# Patient Record
Sex: FEMALE | Race: WHITE | NOT HISPANIC OR LATINO
[De-identification: names, ages, dates, MRNs, and addresses within clinical notes are randomized per-mention and may not be internally consistent; named-entity substitution may affect disease eponyms.]

---

## 2020-10-08 ENCOUNTER — RESULT REVIEW (OUTPATIENT)
Age: 25
End: 2020-10-08

## 2020-12-10 ENCOUNTER — EMERGENCY (EMERGENCY)
Facility: HOSPITAL | Age: 25
LOS: 1 days | Discharge: HOME | End: 2020-12-10
Attending: EMERGENCY MEDICINE | Admitting: EMERGENCY MEDICINE
Payer: COMMERCIAL

## 2020-12-10 VITALS
DIASTOLIC BLOOD PRESSURE: 80 MMHG | HEART RATE: 105 BPM | HEIGHT: 61 IN | SYSTOLIC BLOOD PRESSURE: 137 MMHG | OXYGEN SATURATION: 100 % | WEIGHT: 104.06 LBS | TEMPERATURE: 99 F | RESPIRATION RATE: 18 BRPM

## 2020-12-10 VITALS
SYSTOLIC BLOOD PRESSURE: 96 MMHG | RESPIRATION RATE: 16 BRPM | HEART RATE: 84 BPM | DIASTOLIC BLOOD PRESSURE: 55 MMHG | OXYGEN SATURATION: 99 %

## 2020-12-10 DIAGNOSIS — R19.7 DIARRHEA, UNSPECIFIED: ICD-10-CM

## 2020-12-10 DIAGNOSIS — R11.2 NAUSEA WITH VOMITING, UNSPECIFIED: ICD-10-CM

## 2020-12-10 DIAGNOSIS — Z88.0 ALLERGY STATUS TO PENICILLIN: ICD-10-CM

## 2020-12-10 DIAGNOSIS — R10.84 GENERALIZED ABDOMINAL PAIN: ICD-10-CM

## 2020-12-10 DIAGNOSIS — Z20.828 CONTACT WITH AND (SUSPECTED) EXPOSURE TO OTHER VIRAL COMMUNICABLE DISEASES: ICD-10-CM

## 2020-12-10 LAB
ALBUMIN SERPL ELPH-MCNC: 4.7 G/DL — SIGNIFICANT CHANGE UP (ref 3.3–5)
ALP SERPL-CCNC: 48 U/L — SIGNIFICANT CHANGE UP (ref 40–120)
ALT FLD-CCNC: 19 U/L — SIGNIFICANT CHANGE UP (ref 10–45)
ANION GAP SERPL CALC-SCNC: 11 MMOL/L — SIGNIFICANT CHANGE UP (ref 5–17)
AST SERPL-CCNC: 20 U/L — SIGNIFICANT CHANGE UP (ref 10–40)
BASOPHILS # BLD AUTO: 0.03 K/UL — SIGNIFICANT CHANGE UP (ref 0–0.2)
BASOPHILS NFR BLD AUTO: 0.7 % — SIGNIFICANT CHANGE UP (ref 0–2)
BILIRUB SERPL-MCNC: 0.4 MG/DL — SIGNIFICANT CHANGE UP (ref 0.2–1.2)
BUN SERPL-MCNC: 8 MG/DL — SIGNIFICANT CHANGE UP (ref 7–23)
CALCIUM SERPL-MCNC: 9.7 MG/DL — SIGNIFICANT CHANGE UP (ref 8.4–10.5)
CHLORIDE SERPL-SCNC: 100 MMOL/L — SIGNIFICANT CHANGE UP (ref 96–108)
CO2 SERPL-SCNC: 26 MMOL/L — SIGNIFICANT CHANGE UP (ref 22–31)
CREAT SERPL-MCNC: 0.66 MG/DL — SIGNIFICANT CHANGE UP (ref 0.5–1.3)
EOSINOPHIL # BLD AUTO: 0.04 K/UL — SIGNIFICANT CHANGE UP (ref 0–0.5)
EOSINOPHIL NFR BLD AUTO: 0.9 % — SIGNIFICANT CHANGE UP (ref 0–6)
GLUCOSE SERPL-MCNC: 89 MG/DL — SIGNIFICANT CHANGE UP (ref 70–99)
HCT VFR BLD CALC: 41.3 % — SIGNIFICANT CHANGE UP (ref 34.5–45)
HGB BLD-MCNC: 13.5 G/DL — SIGNIFICANT CHANGE UP (ref 11.5–15.5)
IMM GRANULOCYTES NFR BLD AUTO: 0.2 % — SIGNIFICANT CHANGE UP (ref 0–1.5)
LYMPHOCYTES # BLD AUTO: 1.27 K/UL — SIGNIFICANT CHANGE UP (ref 1–3.3)
LYMPHOCYTES # BLD AUTO: 27.6 % — SIGNIFICANT CHANGE UP (ref 13–44)
MCHC RBC-ENTMCNC: 29.2 PG — SIGNIFICANT CHANGE UP (ref 27–34)
MCHC RBC-ENTMCNC: 32.7 GM/DL — SIGNIFICANT CHANGE UP (ref 32–36)
MCV RBC AUTO: 89.2 FL — SIGNIFICANT CHANGE UP (ref 80–100)
MONOCYTES # BLD AUTO: 0.3 K/UL — SIGNIFICANT CHANGE UP (ref 0–0.9)
MONOCYTES NFR BLD AUTO: 6.5 % — SIGNIFICANT CHANGE UP (ref 2–14)
NEUTROPHILS # BLD AUTO: 2.95 K/UL — SIGNIFICANT CHANGE UP (ref 1.8–7.4)
NEUTROPHILS NFR BLD AUTO: 64.1 % — SIGNIFICANT CHANGE UP (ref 43–77)
NRBC # BLD: 0 /100 WBCS — SIGNIFICANT CHANGE UP (ref 0–0)
PLATELET # BLD AUTO: 198 K/UL — SIGNIFICANT CHANGE UP (ref 150–400)
POTASSIUM SERPL-MCNC: 4 MMOL/L — SIGNIFICANT CHANGE UP (ref 3.5–5.3)
POTASSIUM SERPL-SCNC: 4 MMOL/L — SIGNIFICANT CHANGE UP (ref 3.5–5.3)
PROT SERPL-MCNC: 7.6 G/DL — SIGNIFICANT CHANGE UP (ref 6–8.3)
RBC # BLD: 4.63 M/UL — SIGNIFICANT CHANGE UP (ref 3.8–5.2)
RBC # FLD: 12.3 % — SIGNIFICANT CHANGE UP (ref 10.3–14.5)
SARS-COV-2 RNA SPEC QL NAA+PROBE: SIGNIFICANT CHANGE UP
SODIUM SERPL-SCNC: 137 MMOL/L — SIGNIFICANT CHANGE UP (ref 135–145)
WBC # BLD: 4.6 K/UL — SIGNIFICANT CHANGE UP (ref 3.8–10.5)
WBC # FLD AUTO: 4.6 K/UL — SIGNIFICANT CHANGE UP (ref 3.8–10.5)

## 2020-12-10 PROCEDURE — 36415 COLL VENOUS BLD VENIPUNCTURE: CPT

## 2020-12-10 PROCEDURE — 96374 THER/PROPH/DIAG INJ IV PUSH: CPT

## 2020-12-10 PROCEDURE — 80053 COMPREHEN METABOLIC PANEL: CPT

## 2020-12-10 PROCEDURE — U0003: CPT

## 2020-12-10 PROCEDURE — 99284 EMERGENCY DEPT VISIT MOD MDM: CPT

## 2020-12-10 PROCEDURE — 85025 COMPLETE CBC W/AUTO DIFF WBC: CPT

## 2020-12-10 PROCEDURE — 99284 EMERGENCY DEPT VISIT MOD MDM: CPT | Mod: 25

## 2020-12-10 RX ORDER — SODIUM CHLORIDE 9 MG/ML
1000 INJECTION INTRAMUSCULAR; INTRAVENOUS; SUBCUTANEOUS ONCE
Refills: 0 | Status: COMPLETED | OUTPATIENT
Start: 2020-12-10 | End: 2020-12-10

## 2020-12-10 RX ORDER — ONDANSETRON 8 MG/1
4 TABLET, FILM COATED ORAL ONCE
Refills: 0 | Status: COMPLETED | OUTPATIENT
Start: 2020-12-10 | End: 2020-12-10

## 2020-12-10 RX ADMIN — SODIUM CHLORIDE 1000 MILLILITER(S): 9 INJECTION INTRAMUSCULAR; INTRAVENOUS; SUBCUTANEOUS at 11:12

## 2020-12-10 RX ADMIN — ONDANSETRON 4 MILLIGRAM(S): 8 TABLET, FILM COATED ORAL at 11:12

## 2020-12-10 NOTE — ED PROVIDER NOTE - NSFOLLOWUPINSTRUCTIONS_ED_ALL_ED_FT
ACUTE DIARRHEA - General Information           Acute Diarrhea    WHAT YOU NEED TO KNOW:    What do I need to know about acute diarrhea?Acute diarrhea starts quickly and lasts a short time, usually 1 to 3 days. It can last up to 2 weeks.     What causes acute diarrhea?   •Bacteria, such as E coli or salmonella      •Viruses, such as rotavirus and norovirus      •A parasite, such as giardia      •Medicines, such as laxatives, antacids, or antibiotics      •An allergy to lactose, soy, or gluten      •Eating food or drinking water that contains germs      •Medical treatments, such as chemotherapy or radiation      What other signs and symptoms might I have with acute diarrhea? You may have 3 or more episodes of diarrhea. It may be hard to control your diarrhea. You may also have any of the following:   •Fever and chills      •Headache or abdominal pain      •Nausea and vomiting      •Symptoms of dehydration such as thirst, decreased urination, dry skin, sunken eyes, or fast, pounding heartbeat      What does my healthcare provider need to know about my acute diarrhea? Your healthcare provider will ask about your symptoms. He or she will ask what you have recently eaten and if you have traveled to other countries. Tell the provider what medicines you use or if you have been around anyone who is sick. Your healthcare provider may check you for signs of dehydration. He or she may also test your blood and bowel movement for infection.     How is acute diarrhea treated? Acute diarrhea usually gets better without treatment. You may need any of the following if your diarrhea is severe or lasts longer than a few days:   •Diarrhea medicine is an over-the-counter medicine that helps slow or stop your diarrhea. Do not take this medicine unless your healthcare provider says it is okay.       •Antibiotics may be given to help treat an infection caused by bacteria.       •Antiparasitics may be given to treat an infection caused by parasites.       How can acute diarrhea be managed?   •Drink liquids as directed. Liquids will help prevent dehydration caused by diarrhea. Ask your healthcare provider how much liquid to drink each day and which liquids are best for you. You may need to drink an oral rehydration solution (ORS). An ORS has the right amounts of water, salts, and sugar you need to replace body fluids. You can buy an ORS at most grocery stores and pharmacies.       •Eat foods that are easy to digest. Examples include rice, lentils, cereal, bananas, potatoes, and bread. It also includes some fruits (bananas, melon), well-cooked vegetables, and lean meats. Do not eat foods high in fiber, fat, or sugar. Also, do not drink alcohol until your diarrhea is gone.       How can acute diarrhea be prevented?   •Wash your hands often. Use soap and water. Wash your hands before you eat or prepare food. Also wash your hands after you use the bathroom. Use an alcohol-based hand gel when soap and water are not available.  Handwashing           •Keep bathroom surfaces clean. This helps prevent the spread of germs that cause acute diarrhea.       •Wash fruits and vegetables well before you eat them. This can help remove germs that cause diarrhea. If possible, remove the skin from fruits and vegetables, or cook them well before you eat them.       •Cook meat and poultry as directed. Meat includes beef and pork. Poultry includes chicken, turkey, and duck.?Cook ground meat to 160°F.       ?Cook ground poultry, whole poultry, or cuts of poultry to at least 165°F. Remove the poultry from heat. Let it stand for 3 minutes before you eat it.       ?Cook whole cuts of meat other than poultry to at least 145°F. Remove the meat from heat. Let it stand for 3 minutes before you eat it.       •Wash dishes that have touched raw meat or poultry with hot water and soap. This includes cutting boards, utensils, dishes, and serving containers.       •Place raw or cooked meat in the refrigerator as soon as possible. Bacteria can grow in meat that is left at room temperature too long.       •Do not eat raw or undercooked oysters, clams, or mussels. These foods may be contaminated and cause infection.       •Drink only filtered or treated water when you travel. Do not put ice in your drinks. Drink bottled water whenever possible.       When should I seek immediate care?   •You feel confused.       •Your heartbeat is faster than usual.       •Your eyes look deeply sunken, or you have no tears when you cry.       •You urinate less than usual, or your urine is dark yellow.       •You have blood or mucus in your bowel movements.      •You have severe abdominal pain.       •You are unable to drink any liquids.       When should I contact my healthcare provider?   •Your symptoms do not get better with treatment.       •You have a fever higher than 101.3°F (38.5°C).       •You have trouble eating and drinking because you are vomiting.       •Your diarrhea does not get better in 7 days.       •You have questions or concerns about your condition or care.       CARE AGREEMENT:    You have the right to help plan your care. Learn about your health condition and how it may be treated. Discuss treatment options with your healthcare providers to decide what care you want to receive. You always have the right to refuse treatment.

## 2020-12-10 NOTE — ED ADULT NURSE NOTE - OBJECTIVE STATEMENT
Presents to Ed c/o diarrhea, +N/V, slight SOB, and fatigue. Started this AM, denies CP. AAOx3, MAEx4. Pt works in OR at [employer]. Denies fevers.

## 2020-12-10 NOTE — ED PROVIDER NOTE - OBJECTIVE STATEMENT
26 y/o F [employer] employee w/ no significant PMHx presents to the ED c/o diffuse abd cramping, multiple episodes of V/D since this morning. Denies fever, chills, blood in stool, CP, SOB or any other acute sx. No meds pta. No new foods yesterday. Before work did questionnaire and was recommended to come to the ED for evaluation.

## 2020-12-10 NOTE — ED PROVIDER NOTE - CLINICAL SUMMARY MEDICAL DECISION MAKING FREE TEXT BOX
24 y/o F [employer] employee w/ no significant PMHx presents c/o diffuse abd cramping, multiple episodes of V/D since this morning. Plan for labs, will hydrate and give anti nausea meds. Anticipate d/c home.

## 2020-12-10 NOTE — ED PROVIDER NOTE - PATIENT PORTAL LINK FT
You can access the FollowMyHealth Patient Portal offered by [facility] by registering at the following website: [email or web address]. By joining [employer]’s FollowMyHealth portal, you will also be able to view your health information using other applications (apps) compatible with our system.

## 2020-12-15 ENCOUNTER — RESULT REVIEW (OUTPATIENT)
Age: 25
End: 2020-12-15

## 2020-12-15 ENCOUNTER — APPOINTMENT (OUTPATIENT)
Dept: OBGYN | Facility: CLINIC | Age: 25
End: 2020-12-15
Payer: COMMERCIAL

## 2020-12-15 PROBLEM — Z78.9 OTHER SPECIFIED HEALTH STATUS: Chronic | Status: ACTIVE | Noted: 2020-12-10

## 2020-12-15 PROCEDURE — 99395 PREV VISIT EST AGE 18-39: CPT

## 2020-12-15 PROCEDURE — 99072 ADDL SUPL MATRL&STAF TM PHE: CPT

## 2021-04-06 ENCOUNTER — APPOINTMENT (OUTPATIENT)
Dept: OBGYN | Facility: CLINIC | Age: 26
End: 2021-04-06
Payer: COMMERCIAL

## 2021-04-06 PROCEDURE — 99072 ADDL SUPL MATRL&STAF TM PHE: CPT

## 2021-04-06 PROCEDURE — 99213 OFFICE O/P EST LOW 20 MIN: CPT

## 2021-04-16 ENCOUNTER — APPOINTMENT (OUTPATIENT)
Dept: OBGYN | Facility: CLINIC | Age: 26
End: 2021-04-16

## 2021-08-23 PROBLEM — Z00.00 ENCOUNTER FOR PREVENTIVE HEALTH EXAMINATION: Status: ACTIVE | Noted: 2021-08-23

## 2023-04-04 ENCOUNTER — ASOB RESULT (OUTPATIENT)
Age: 28
End: 2023-04-04

## 2023-04-04 ENCOUNTER — APPOINTMENT (OUTPATIENT)
Dept: ANTEPARTUM | Facility: CLINIC | Age: 28
End: 2023-04-04
Payer: COMMERCIAL

## 2023-04-04 ENCOUNTER — NON-APPOINTMENT (OUTPATIENT)
Age: 28
End: 2023-04-04

## 2023-04-04 PROCEDURE — 76811 OB US DETAILED SNGL FETUS: CPT

## 2023-04-04 PROCEDURE — 99202 OFFICE O/P NEW SF 15 MIN: CPT | Mod: 25

## 2023-07-17 ENCOUNTER — APPOINTMENT (OUTPATIENT)
Dept: OBGYN | Facility: CLINIC | Age: 28
End: 2023-07-17
Payer: COMMERCIAL

## 2023-07-17 PROCEDURE — 0502F SUBSEQUENT PRENATAL CARE: CPT

## 2023-07-19 ENCOUNTER — APPOINTMENT (OUTPATIENT)
Dept: OBGYN | Facility: CLINIC | Age: 28
End: 2023-07-19

## 2023-07-24 ENCOUNTER — APPOINTMENT (OUTPATIENT)
Dept: OBGYN | Facility: CLINIC | Age: 28
End: 2023-07-24
Payer: COMMERCIAL

## 2023-07-24 PROCEDURE — 0502F SUBSEQUENT PRENATAL CARE: CPT

## 2023-07-31 ENCOUNTER — APPOINTMENT (OUTPATIENT)
Dept: OBGYN | Facility: CLINIC | Age: 28
End: 2023-07-31
Payer: COMMERCIAL

## 2023-07-31 PROCEDURE — 0502F SUBSEQUENT PRENATAL CARE: CPT

## 2023-08-04 ENCOUNTER — APPOINTMENT (OUTPATIENT)
Dept: OBGYN | Facility: CLINIC | Age: 28
End: 2023-08-04
Payer: COMMERCIAL

## 2023-08-04 PROCEDURE — 0502F SUBSEQUENT PRENATAL CARE: CPT

## 2023-08-04 PROCEDURE — 59425 ANTEPARTUM CARE ONLY: CPT

## 2023-08-09 ENCOUNTER — INPATIENT (INPATIENT)
Facility: HOSPITAL | Age: 28
LOS: 1 days | Discharge: HOME | End: 2023-08-11
Attending: OBSTETRICS & GYNECOLOGY | Admitting: OBSTETRICS & GYNECOLOGY
Payer: COMMERCIAL

## 2023-08-09 VITALS
TEMPERATURE: 98 F | OXYGEN SATURATION: 100 % | SYSTOLIC BLOOD PRESSURE: 115 MMHG | DIASTOLIC BLOOD PRESSURE: 76 MMHG | RESPIRATION RATE: 18 BRPM | HEART RATE: 85 BPM

## 2023-08-09 DIAGNOSIS — Z34.80 ENCOUNTER FOR SUPERVISION OF OTHER NORMAL PREGNANCY, UNSPECIFIED TRIMESTER: ICD-10-CM

## 2023-08-09 DIAGNOSIS — O26.899 OTHER SPECIFIED PREGNANCY RELATED CONDITIONS, UNSPECIFIED TRIMESTER: ICD-10-CM

## 2023-08-09 DIAGNOSIS — Z34.90 ENCOUNTER FOR SUPERVISION OF NORMAL PREGNANCY, UNSPECIFIED, UNSPECIFIED TRIMESTER: ICD-10-CM

## 2023-08-09 LAB
BASOPHILS # BLD AUTO: 0.02 K/UL — SIGNIFICANT CHANGE UP (ref 0–0.2)
BASOPHILS NFR BLD AUTO: 0.1 % — SIGNIFICANT CHANGE UP (ref 0–2)
COVID-19 SPIKE DOMAIN AB INTERP: POSITIVE
COVID-19 SPIKE DOMAIN ANTIBODY RESULT: >250 U/ML — HIGH
EOSINOPHIL # BLD AUTO: 0.04 K/UL — SIGNIFICANT CHANGE UP (ref 0–0.5)
EOSINOPHIL NFR BLD AUTO: 0.3 % — SIGNIFICANT CHANGE UP (ref 0–6)
HCT VFR BLD CALC: 35.9 % — SIGNIFICANT CHANGE UP (ref 34.5–45)
HGB BLD-MCNC: 11 G/DL — LOW (ref 11.5–15.5)
IMM GRANULOCYTES NFR BLD AUTO: 0.9 % — SIGNIFICANT CHANGE UP (ref 0–0.9)
LYMPHOCYTES # BLD AUTO: 1.17 K/UL — SIGNIFICANT CHANGE UP (ref 1–3.3)
LYMPHOCYTES # BLD AUTO: 8.5 % — LOW (ref 13–44)
MCHC RBC-ENTMCNC: 23.6 PG — LOW (ref 27–34)
MCHC RBC-ENTMCNC: 30.6 GM/DL — LOW (ref 32–36)
MCV RBC AUTO: 77 FL — LOW (ref 80–100)
MONOCYTES # BLD AUTO: 0.5 K/UL — SIGNIFICANT CHANGE UP (ref 0–0.9)
MONOCYTES NFR BLD AUTO: 3.7 % — SIGNIFICANT CHANGE UP (ref 2–14)
NEUTROPHILS # BLD AUTO: 11.83 K/UL — HIGH (ref 1.8–7.4)
NEUTROPHILS NFR BLD AUTO: 86.5 % — HIGH (ref 43–77)
NRBC # BLD: 0 /100 WBCS — SIGNIFICANT CHANGE UP (ref 0–0)
PLATELET # BLD AUTO: 233 K/UL — SIGNIFICANT CHANGE UP (ref 150–400)
RBC # BLD: 4.66 M/UL — SIGNIFICANT CHANGE UP (ref 3.8–5.2)
RBC # FLD: 14.8 % — HIGH (ref 10.3–14.5)
SARS-COV-2 IGG+IGM SERPL QL IA: >250 U/ML — HIGH
SARS-COV-2 IGG+IGM SERPL QL IA: POSITIVE
T PALLIDUM AB TITR SER: NEGATIVE — SIGNIFICANT CHANGE UP
WBC # BLD: 13.69 K/UL — HIGH (ref 3.8–10.5)
WBC # FLD AUTO: 13.69 K/UL — HIGH (ref 3.8–10.5)

## 2023-08-09 RX ORDER — DIPHENHYDRAMINE HCL 50 MG
25 CAPSULE ORAL EVERY 6 HOURS
Refills: 0 | Status: DISCONTINUED | OUTPATIENT
Start: 2023-08-09 | End: 2023-08-11

## 2023-08-09 RX ORDER — OXYTOCIN 10 UNIT/ML
333.33 VIAL (ML) INJECTION
Qty: 20 | Refills: 0 | Status: DISCONTINUED | OUTPATIENT
Start: 2023-08-09 | End: 2023-08-09

## 2023-08-09 RX ORDER — KETOROLAC TROMETHAMINE 30 MG/ML
30 SYRINGE (ML) INJECTION ONCE
Refills: 0 | Status: DISCONTINUED | OUTPATIENT
Start: 2023-08-09 | End: 2023-08-09

## 2023-08-09 RX ORDER — SIMETHICONE 80 MG/1
80 TABLET, CHEWABLE ORAL EVERY 4 HOURS
Refills: 0 | Status: DISCONTINUED | OUTPATIENT
Start: 2023-08-09 | End: 2023-08-11

## 2023-08-09 RX ORDER — OXYCODONE HYDROCHLORIDE 5 MG/1
5 TABLET ORAL
Refills: 0 | Status: DISCONTINUED | OUTPATIENT
Start: 2023-08-09 | End: 2023-08-11

## 2023-08-09 RX ORDER — DIBUCAINE 1 %
1 OINTMENT (GRAM) RECTAL EVERY 6 HOURS
Refills: 0 | Status: DISCONTINUED | OUTPATIENT
Start: 2023-08-09 | End: 2023-08-11

## 2023-08-09 RX ORDER — SODIUM CHLORIDE 9 MG/ML
1000 INJECTION, SOLUTION INTRAVENOUS
Refills: 0 | Status: DISCONTINUED | OUTPATIENT
Start: 2023-08-09 | End: 2023-08-09

## 2023-08-09 RX ORDER — IBUPROFEN 200 MG
600 TABLET ORAL EVERY 6 HOURS
Refills: 0 | Status: COMPLETED | OUTPATIENT
Start: 2023-08-09 | End: 2024-07-07

## 2023-08-09 RX ORDER — HYDROCORTISONE 1 %
1 OINTMENT (GRAM) TOPICAL EVERY 6 HOURS
Refills: 0 | Status: DISCONTINUED | OUTPATIENT
Start: 2023-08-09 | End: 2023-08-11

## 2023-08-09 RX ORDER — MAGNESIUM HYDROXIDE 400 MG/1
30 TABLET, CHEWABLE ORAL
Refills: 0 | Status: DISCONTINUED | OUTPATIENT
Start: 2023-08-09 | End: 2023-08-11

## 2023-08-09 RX ORDER — SODIUM CHLORIDE 9 MG/ML
3 INJECTION INTRAMUSCULAR; INTRAVENOUS; SUBCUTANEOUS EVERY 8 HOURS
Refills: 0 | Status: DISCONTINUED | OUTPATIENT
Start: 2023-08-09 | End: 2023-08-11

## 2023-08-09 RX ORDER — OXYTOCIN 10 UNIT/ML
41.67 VIAL (ML) INJECTION
Qty: 20 | Refills: 0 | Status: DISCONTINUED | OUTPATIENT
Start: 2023-08-09 | End: 2023-08-11

## 2023-08-09 RX ORDER — CITRIC ACID/SODIUM CITRATE 300-500 MG
15 SOLUTION, ORAL ORAL EVERY 6 HOURS
Refills: 0 | Status: DISCONTINUED | OUTPATIENT
Start: 2023-08-09 | End: 2023-08-09

## 2023-08-09 RX ORDER — ACETAMINOPHEN 500 MG
975 TABLET ORAL
Refills: 0 | Status: DISCONTINUED | OUTPATIENT
Start: 2023-08-09 | End: 2023-08-11

## 2023-08-09 RX ORDER — LANOLIN
1 OINTMENT (GRAM) TOPICAL EVERY 6 HOURS
Refills: 0 | Status: DISCONTINUED | OUTPATIENT
Start: 2023-08-09 | End: 2023-08-11

## 2023-08-09 RX ORDER — CHLORHEXIDINE GLUCONATE 213 G/1000ML
1 SOLUTION TOPICAL DAILY
Refills: 0 | Status: DISCONTINUED | OUTPATIENT
Start: 2023-08-09 | End: 2023-08-09

## 2023-08-09 RX ORDER — BENZOCAINE 10 %
1 GEL (GRAM) MUCOUS MEMBRANE EVERY 6 HOURS
Refills: 0 | Status: DISCONTINUED | OUTPATIENT
Start: 2023-08-09 | End: 2023-08-11

## 2023-08-09 RX ORDER — PRAMOXINE HYDROCHLORIDE 150 MG/15G
1 AEROSOL, FOAM RECTAL EVERY 4 HOURS
Refills: 0 | Status: DISCONTINUED | OUTPATIENT
Start: 2023-08-09 | End: 2023-08-11

## 2023-08-09 RX ORDER — TETANUS TOXOID, REDUCED DIPHTHERIA TOXOID AND ACELLULAR PERTUSSIS VACCINE, ADSORBED 5; 2.5; 8; 8; 2.5 [IU]/.5ML; [IU]/.5ML; UG/.5ML; UG/.5ML; UG/.5ML
0.5 SUSPENSION INTRAMUSCULAR ONCE
Refills: 0 | Status: DISCONTINUED | OUTPATIENT
Start: 2023-08-09 | End: 2023-08-11

## 2023-08-09 RX ORDER — OXYCODONE HYDROCHLORIDE 5 MG/1
5 TABLET ORAL ONCE
Refills: 0 | Status: DISCONTINUED | OUTPATIENT
Start: 2023-08-09 | End: 2023-08-11

## 2023-08-09 RX ORDER — AER TRAVELER 0.5 G/1
1 SOLUTION RECTAL; TOPICAL EVERY 4 HOURS
Refills: 0 | Status: DISCONTINUED | OUTPATIENT
Start: 2023-08-09 | End: 2023-08-11

## 2023-08-09 RX ADMIN — Medication 30 MILLIGRAM(S): at 17:50

## 2023-08-09 RX ADMIN — Medication 30 MILLIGRAM(S): at 19:11

## 2023-08-09 RX ADMIN — Medication 975 MILLIGRAM(S): at 22:00

## 2023-08-09 RX ADMIN — Medication 975 MILLIGRAM(S): at 21:15

## 2023-08-09 RX ADMIN — SODIUM CHLORIDE 3 MILLILITER(S): 9 INJECTION INTRAMUSCULAR; INTRAVENOUS; SUBCUTANEOUS at 21:30

## 2023-08-09 NOTE — OB PROVIDER H&P - NS PANP COMMENT GEN_ALL_CORE FT
pt assessed at bedside  P0 in labor  requesting epidural  admit to L&D  will examine pt when more comfortable

## 2023-08-09 NOTE — OB RN DELIVERY SUMMARY - NS_SEPSISRSKCALC_OBGYN_ALL_OB_FT
EOS calculated successfully. EOS Risk Factor: 0.5/1000 live births ([facility] national incidence); GA=39w6d; Temp=98.6; ROM=3.183; GBS='Negative'; Antibiotics='No antibiotics or any antibiotics < 2 hrs prior to birth'

## 2023-08-09 NOTE — OB PROVIDER H&P - NS_FHRACCEL_OBGYN_ALL_OB
Message reviewed, no further action needed.  
Patient is returning call, please see message below.     Callback Number: [phone] (home)   Best Availability: anytime  Can A Detailed Message Be Left? yes  Did you confirm the message with the caller?: yes  Patient states she received a call 11/26/19 regarding an order and would like to speak to RN, writer unable to locate and note.  Thank you,  Lily Greenberg    
Pt called stating she got a message from our office, it was an automated recording asking about orders. Writer notified pt there are no messages regarding anyone calling pt.    Will forward to Dr. Hopper to see if she called pt.  Pt stated no need to call back if no one called her from this office.  
Present (15 x15 bpm)

## 2023-08-09 NOTE — OB PROVIDER H&P - PROBLEM SELECTOR PLAN 1
27 year old  at 39.6 weeks in labor, -GBS    -Admit to L and D  -Routine labs  -NPO/IVF  -Bictra  -EFM/toco  -Anesthesia consult  -Expectant management  -Anticipate

## 2023-08-09 NOTE — OB PROVIDER H&P - HISTORY OF PRESENT ILLNESS
27 year old  at 39.6 weeks presents with contractions q5min since 4am. -LOF, -VB, +FM. Uncomplicated pregnancy. -GBS.    ObHx: primigravida  MedHx: denies  SurgHx: rhinoplasty  GynHx: denies fibroids, cysts, abnormal paps, STIs  SocialHx: denies ETOH, tobacco, drug use  PsychHx: denies anxiety, depression  All: PCN-rash; NKFA, NKEA  Meds: PNV    Vital Signs Last 24 Hrs  T(C): 36.7 (09 Aug 2023 09:41), Max: 36.7 (09 Aug 2023 09:22)  T(F): 98.1 (09 Aug 2023 09:41), Max: 98.1 (09 Aug 2023 09:24)  HR: 81 (09 Aug 2023 09:47) (81 - 90)  BP: 115/76 (09 Aug 2023 09:41) (115/76 - 115/76)  BP(mean): --  RR: 16 (09 Aug 2023 09:41) (16 - 18)  SpO2: 100% (09 Aug 2023 09:47) (100% - 100%)    PE: NAD, AOx3, abdomen soft gravid  VE: 4.0/90/-2  EFM: 125, moderate variability, -accels, -decels  [place]: 2-4min   EFW:   
Clear

## 2023-08-09 NOTE — PRE-ANESTHESIA EVALUATION ADULT - NSANTHSNORERD_ENT_A_CORE
You have worsening swelling associated with difficulty opening her mouth, fever chills, please return to the ER.  Is more to follow-up with your regular doctor moving forward.  
No

## 2023-08-09 NOTE — OB PROVIDER DELIVERY SUMMARY - NSPROVIDERDELIVERYNOTE_OBGYN_ALL_OB_FT
Spontaneous vaginal delivery of liveborn infant girl from OA position  Left compound hand and short cord noted   APGARS 9/9  Delayed cord clamping performed  Cord gases collected  Placenta delivered intact  Fundus firm  Intact cervix, vaginal walls and sulci  2nd deg lac repaired with 2.0 vicryl rapide   Excellent hemostasis  Count correct x 2    lidia morelandm

## 2023-08-09 NOTE — OB PROVIDER LABOR PROGRESS NOTE - NS_OBIHIFHRDETAILS_OBGYN_ALL_OB_FT
135bpm, mod variability, +accels, no decels
135bpm, mod variability, +accels, no decels
145bpm, mod variability, +accels, no decels

## 2023-08-09 NOTE — OB RN DELIVERY SUMMARY - NSSELHIDDEN_OBGYN_ALL_OB_FT
[NS_DeliveryAttending1_OBGYN_ALL_OB_FT:[id number]==],[NS_DeliveryRN_OBGYN_ALL_OB_FT:[id number]]

## 2023-08-09 NOTE — OB RN PATIENT PROFILE - FALL HARM RISK - UNIVERSAL INTERVENTIONS
[de-identified] : NSR RSR' pattern V1, no change from prior Bed in lowest position, wheels locked, appropriate side rails in place/Call bell, personal items and telephone in reach/Instruct patient to call for assistance before getting out of bed or chair/Non-slip footwear when patient is out of bed/[place] to call system/Physically safe environment - no spills, clutter or unnecessary equipment/Purposeful Proactive Rounding/Room/bathroom lighting operational, light cord in reach

## 2023-08-09 NOTE — OB PROVIDER LABOR PROGRESS NOTE - ASSESSMENT
AROM, clear fluid    - pt awaiting  to return from home  - will start pushing when he arrives  - anticipate  
- pt to start pushing  - anticipate 
28 yo P0 at 39.6 wks  active labor  GBS neg  Cat I Tracing    Plan  - expectant management  - pt to advise with srom or urge to push

## 2023-08-09 NOTE — OB PROVIDER H&P - NSLOWPPHRISK_OBGYN_A_OB
No previous uterine incision/[place] Pregnancy/Less than or equal to 4 previous vaginal births/No known bleeding disorder/No history of postpartum hemorrhage/No other PPH risks indicated

## 2023-08-09 NOTE — OB PROVIDER H&P - ASSESSMENT
27 year old  at 39.6 weeks in labor, -GBS    -Admit to L and D  -Routine labs  -NPO/IVF  -Bictra  -EFM/toco  -Anesthesia consult  -Expectant management  -Anticipate     d/w [facility]

## 2023-08-09 NOTE — OB RN DELIVERY SUMMARY - NS_GENERALBABYACOMMENTA_OBGYN_ALL_OB_FT
NB weight from [employer] NB weight is 6lbs 9oz so all paperwork documented with weight. NB weight from [employer] NB weight is 6lbs 9oz so all paperwork documented with weight.  compound (hand)presentation, short cord

## 2023-08-10 RX ORDER — SENNA PLUS 8.6 MG/1
1 TABLET ORAL ONCE
Refills: 0 | Status: DISCONTINUED | OUTPATIENT
Start: 2023-08-10 | End: 2023-08-11

## 2023-08-10 RX ORDER — IBUPROFEN 200 MG
600 TABLET ORAL EVERY 6 HOURS
Refills: 0 | Status: DISCONTINUED | OUTPATIENT
Start: 2023-08-10 | End: 2023-08-11

## 2023-08-10 RX ADMIN — Medication 600 MILLIGRAM(S): at 01:30

## 2023-08-10 RX ADMIN — Medication 600 MILLIGRAM(S): at 00:29

## 2023-08-10 RX ADMIN — Medication 600 MILLIGRAM(S): at 18:25

## 2023-08-10 RX ADMIN — Medication 600 MILLIGRAM(S): at 05:34

## 2023-08-10 RX ADMIN — Medication 975 MILLIGRAM(S): at 03:15

## 2023-08-10 RX ADMIN — Medication 975 MILLIGRAM(S): at 09:43

## 2023-08-10 RX ADMIN — Medication 600 MILLIGRAM(S): at 23:51

## 2023-08-10 RX ADMIN — Medication 600 MILLIGRAM(S): at 12:45

## 2023-08-10 RX ADMIN — MAGNESIUM HYDROXIDE 30 MILLILITER(S): 400 TABLET, CHEWABLE ORAL at 20:43

## 2023-08-10 RX ADMIN — Medication 975 MILLIGRAM(S): at 21:13

## 2023-08-10 RX ADMIN — Medication 975 MILLIGRAM(S): at 10:13

## 2023-08-10 RX ADMIN — Medication 975 MILLIGRAM(S): at 02:33

## 2023-08-10 RX ADMIN — Medication 1 TABLET(S): at 12:15

## 2023-08-10 RX ADMIN — Medication 600 MILLIGRAM(S): at 17:55

## 2023-08-10 RX ADMIN — Medication 600 MILLIGRAM(S): at 06:07

## 2023-08-10 RX ADMIN — Medication 600 MILLIGRAM(S): at 12:15

## 2023-08-10 RX ADMIN — Medication 975 MILLIGRAM(S): at 20:43

## 2023-08-10 NOTE — PROGRESS NOTE ADULT - ASSESSMENT
A/P: 27yo PPD#1 s/p  ().  Patient is stable and doing well post-partum.   - Pain well controlled, continue current pain regimen  - Increase ambulation, SCDs when not ambulating  - Continue regular diet  - Jes Joy, PGY-1

## 2023-08-10 NOTE — PROGRESS NOTE ADULT - ATTENDING COMMENTS
Patient seen and examined.  Agree with above.    Regular diet  ambulate  po pain meds  desires d/c tomorrow    Helga Zhang MD  OB attg

## 2023-08-11 ENCOUNTER — TRANSCRIPTION ENCOUNTER (OUTPATIENT)
Age: 28
End: 2023-08-11

## 2023-08-11 ENCOUNTER — APPOINTMENT (OUTPATIENT)
Dept: OBGYN | Facility: CLINIC | Age: 28
End: 2023-08-11

## 2023-08-11 VITALS
SYSTOLIC BLOOD PRESSURE: 106 MMHG | OXYGEN SATURATION: 100 % | RESPIRATION RATE: 18 BRPM | TEMPERATURE: 98 F | HEART RATE: 69 BPM | DIASTOLIC BLOOD PRESSURE: 72 MMHG

## 2023-08-11 PROCEDURE — 86901 BLOOD TYPING SEROLOGIC RH(D): CPT

## 2023-08-11 PROCEDURE — 59050 FETAL MONITOR W/REPORT: CPT

## 2023-08-11 PROCEDURE — 86769 SARS-COV-2 COVID-19 ANTIBODY: CPT

## 2023-08-11 PROCEDURE — 36415 COLL VENOUS BLD VENIPUNCTURE: CPT

## 2023-08-11 PROCEDURE — 86780 TREPONEMA PALLIDUM: CPT

## 2023-08-11 PROCEDURE — 85025 COMPLETE CBC W/AUTO DIFF WBC: CPT

## 2023-08-11 PROCEDURE — 86900 BLOOD TYPING SEROLOGIC ABO: CPT

## 2023-08-11 PROCEDURE — 86850 RBC ANTIBODY SCREEN: CPT

## 2023-08-11 RX ORDER — ACETAMINOPHEN 500 MG
3 TABLET ORAL
Qty: 0 | Refills: 0 | DISCHARGE
Start: 2023-08-11

## 2023-08-11 RX ORDER — IBUPROFEN 200 MG
1 TABLET ORAL
Qty: 0 | Refills: 0 | DISCHARGE
Start: 2023-08-11

## 2023-08-11 RX ADMIN — Medication 600 MILLIGRAM(S): at 05:56

## 2023-08-11 RX ADMIN — Medication 975 MILLIGRAM(S): at 09:26

## 2023-08-11 RX ADMIN — Medication 600 MILLIGRAM(S): at 00:21

## 2023-08-11 RX ADMIN — Medication 600 MILLIGRAM(S): at 06:26

## 2023-08-11 NOTE — DISCHARGE NOTE OB - MEDICATION SUMMARY - MEDICATIONS TO TAKE
I will START or STAY ON the medications listed below when I get home from the hospital:    ibuprofen 600 mg oral tablet  -- 1 tab(s) by mouth every 6 hours  -- Indication: For Term pregnancy    acetaminophen 325 mg oral tablet  -- 3 tab(s) by mouth every 6 hours  -- Indication: For Term pregnancy

## 2023-08-11 NOTE — PROGRESS NOTE ADULT - ASSESSMENT
A: 27 yo P1 s/p uncomplicated   breastfeeding established  normal PP course  VSS    P  Rev'd discharge instructions  Enc'd NSAIDs prn for cramping  Rev'd breastfeeding   Pt to be discharged home

## 2023-08-11 NOTE — DISCHARGE NOTE OB - CARE PROVIDER_API CALL
Gil Mercado  Midwifery  [address] [place] [address], [address]  [place] [address]  Phone: [phone]  Fax: [phone]  Follow Up Time:

## 2023-08-11 NOTE — DISCHARGE NOTE OB - PATIENT PORTAL LINK FT
You can access the FollowMyHealth Patient Portal offered by [facility] by registering at the following website: [email or web address]. By joining "[employer]’s FollowMyHealth portal, you will also be able to view your health information using other applications (apps) compatible with our system.

## 2023-08-11 NOTE — PROGRESS NOTE ADULT - SUBJECTIVE AND OBJECTIVE BOX
OB Progress Note:  PPD#1    S: 29yo  PPD#1 s/p  (). Patient feels well. Patient was dizzy but this has resolved. Pain is well controlled. She is tolerating a regular diet and passing flatus. She is voiding spontaneously, and ambulating without difficulty. Denies CP/SOB. Denies N/V.    O:  Vitals:  Vital Signs Last 24 Hrs  T(C): 36.5 (10 Aug 2023 01:31), Max: 37.0 (09 Aug 2023 11:07)  T(F): 97.7 (10 Aug 2023 01:31), Max: 98.6 (09 Aug 2023 11:07)  HR: 72 (10 Aug 2023 01:31) (61 - 129)  BP: 98/62 (10 Aug 2023 01:31) (90/58 - 143/68)  BP(mean): --  RR: 18 (10 Aug 2023 01:31) (16 - 18)  SpO2: 99% (10 Aug 2023 01:31) (64% - 100%)    Parameters below as of 10 Aug 2023 01:31  Patient On (Oxygen Delivery Method): room air        MEDICATIONS  (STANDING):  acetaminophen     Tablet .. 975 milliGRAM(s) Oral <User Schedule>  diphtheria/tetanus/pertussis (acellular) Vaccine (Adacel) 0.5 milliLiter(s) IntraMuscular once  ibuprofen  Tablet. 600 milliGRAM(s) Oral every 6 hours  oxytocin Infusion 41.667 milliUNIT(s)/Min (125 mL/Hr) IV Continuous <Continuous>  prenatal multivitamin 1 Tablet(s) Oral daily  sodium chloride 0.9% lock flush 3 milliLiter(s) IV Push every 8 hours      Labs:  Blood type: O Positive  Rubella IgG: RPR: Negative                          11.0<L>   13.69<H> >-----------< 233    (  @ 10:09 )             35.9                  Physical Exam:  General: NAD  Abdomen: soft, non-tender, non-distended, fundus firm  Vaginal: Lochia wnl  Extremities: No erythema/edema    
Pt seen at bedside. Doing well, denies complaints.  Reports mild lochia. +cramping with breastfeeding  Ambulating and voiding without issue   Desires discharge      GENERAL: NAD, Resting in bed  CHEST/LUNG: Non labored respirations   BREAST: Soft, filling, no evidence of poor latch   HEART: Regular rate and rhythm  ABDOMEN: Uterus firm and 3 FB below umbilicus  PERINEUM: Laceration intact   EXTREMITIES:  No clubbing, cyanosis, or edema  NERVOUS SYSTEM:  Alert & Oriented X3

## 2023-08-11 NOTE — DISCHARGE NOTE OB - PROVIDER TOKENS
PROVIDER:[TOKEN:[[id number]]]
Breath Sounds equal & clear to percussion & auscultation, no accessory muscle use

## 2023-08-11 NOTE — DISCHARGE NOTE OB - CARE PLAN
1 Principal Discharge DX:	 (normal spontaneous vaginal delivery)  Assessment and plan of treatment:	P1 s/p uncomplicated  with normal PP course

## 2023-08-21 ENCOUNTER — APPOINTMENT (OUTPATIENT)
Dept: OBGYN | Facility: CLINIC | Age: 28
End: 2023-08-21

## 2023-09-22 ENCOUNTER — APPOINTMENT (OUTPATIENT)
Dept: OBGYN | Facility: CLINIC | Age: 28
End: 2023-09-22
Payer: COMMERCIAL

## 2023-09-22 PROCEDURE — 0503F POSTPARTUM CARE VISIT: CPT

## 2024-04-01 ENCOUNTER — APPOINTMENT (OUTPATIENT)
Dept: OBGYN | Facility: CLINIC | Age: 29
End: 2024-04-01
Payer: COMMERCIAL

## 2024-04-01 PROCEDURE — 76817 TRANSVAGINAL US OBSTETRIC: CPT

## 2024-04-01 PROCEDURE — 99214 OFFICE O/P EST MOD 30 MIN: CPT | Mod: 25

## 2024-04-01 PROCEDURE — 36415 COLL VENOUS BLD VENIPUNCTURE: CPT

## 2024-04-17 ENCOUNTER — APPOINTMENT (OUTPATIENT)
Dept: OBGYN | Facility: CLINIC | Age: 29
End: 2024-04-17

## 2024-05-03 ENCOUNTER — APPOINTMENT (OUTPATIENT)
Dept: OBGYN | Facility: CLINIC | Age: 29
End: 2024-05-03

## 2024-05-16 ENCOUNTER — APPOINTMENT (OUTPATIENT)
Dept: OBGYN | Facility: CLINIC | Age: 29
End: 2024-05-16
Payer: COMMERCIAL

## 2024-05-16 PROCEDURE — 36415 COLL VENOUS BLD VENIPUNCTURE: CPT

## 2024-05-16 PROCEDURE — 76813 OB US NUCHAL MEAS 1 GEST: CPT

## 2024-05-16 PROCEDURE — 0502F SUBSEQUENT PRENATAL CARE: CPT

## 2024-06-19 ENCOUNTER — APPOINTMENT (OUTPATIENT)
Dept: OBGYN | Facility: CLINIC | Age: 29
End: 2024-06-19
Payer: COMMERCIAL

## 2024-06-19 PROCEDURE — 0502F SUBSEQUENT PRENATAL CARE: CPT

## 2024-06-19 PROCEDURE — 36415 COLL VENOUS BLD VENIPUNCTURE: CPT

## 2024-07-17 ENCOUNTER — APPOINTMENT (OUTPATIENT)
Dept: ANTEPARTUM | Facility: CLINIC | Age: 29
End: 2024-07-17
Payer: COMMERCIAL

## 2024-07-17 ENCOUNTER — ASOB RESULT (OUTPATIENT)
Age: 29
End: 2024-07-17

## 2024-07-17 PROCEDURE — 76805 OB US >/= 14 WKS SNGL FETUS: CPT

## 2024-07-25 ENCOUNTER — APPOINTMENT (OUTPATIENT)
Dept: OBGYN | Facility: CLINIC | Age: 29
End: 2024-07-25

## 2024-08-01 ENCOUNTER — APPOINTMENT (OUTPATIENT)
Dept: OBGYN | Facility: CLINIC | Age: 29
End: 2024-08-01
Payer: COMMERCIAL

## 2024-08-01 PROCEDURE — 0502F SUBSEQUENT PRENATAL CARE: CPT

## 2024-08-01 PROCEDURE — 96127 BRIEF EMOTIONAL/BEHAV ASSMT: CPT

## 2024-08-29 ENCOUNTER — APPOINTMENT (OUTPATIENT)
Dept: OBGYN | Facility: CLINIC | Age: 29
End: 2024-08-29
Payer: COMMERCIAL

## 2024-08-29 PROCEDURE — 36415 COLL VENOUS BLD VENIPUNCTURE: CPT

## 2024-08-29 PROCEDURE — 0502F SUBSEQUENT PRENATAL CARE: CPT

## 2024-09-18 ENCOUNTER — APPOINTMENT (OUTPATIENT)
Dept: OBGYN | Facility: CLINIC | Age: 29
End: 2024-09-18
Payer: COMMERCIAL

## 2024-09-18 PROCEDURE — 76816 OB US FOLLOW-UP PER FETUS: CPT

## 2024-09-18 PROCEDURE — 0502F SUBSEQUENT PRENATAL CARE: CPT

## 2024-09-18 PROCEDURE — 76818 FETAL BIOPHYS PROFILE W/NST: CPT | Mod: 59

## 2024-09-25 ENCOUNTER — APPOINTMENT (OUTPATIENT)
Dept: OBGYN | Facility: CLINIC | Age: 29
End: 2024-09-25
Payer: COMMERCIAL

## 2024-09-25 PROCEDURE — 76818 FETAL BIOPHYS PROFILE W/NST: CPT

## 2024-09-25 PROCEDURE — 0502F SUBSEQUENT PRENATAL CARE: CPT

## 2024-10-09 ENCOUNTER — APPOINTMENT (OUTPATIENT)
Dept: OBGYN | Facility: CLINIC | Age: 29
End: 2024-10-09
Payer: COMMERCIAL

## 2024-10-09 PROCEDURE — 76820 UMBILICAL ARTERY ECHO: CPT | Mod: 59

## 2024-10-09 PROCEDURE — 76816 OB US FOLLOW-UP PER FETUS: CPT

## 2024-10-09 PROCEDURE — 0502F SUBSEQUENT PRENATAL CARE: CPT

## 2024-10-09 PROCEDURE — 76818 FETAL BIOPHYS PROFILE W/NST: CPT | Mod: 59

## 2024-10-16 ENCOUNTER — APPOINTMENT (OUTPATIENT)
Dept: OBGYN | Facility: CLINIC | Age: 29
End: 2024-10-16
Payer: COMMERCIAL

## 2024-10-16 PROCEDURE — 0502F SUBSEQUENT PRENATAL CARE: CPT

## 2024-10-16 PROCEDURE — 76818 FETAL BIOPHYS PROFILE W/NST: CPT

## 2024-10-24 ENCOUNTER — APPOINTMENT (OUTPATIENT)
Dept: OBGYN | Facility: CLINIC | Age: 29
End: 2024-10-24
Payer: SELF-PAY

## 2024-10-24 ENCOUNTER — APPOINTMENT (OUTPATIENT)
Dept: OBGYN | Facility: CLINIC | Age: 29
End: 2024-10-24
Payer: COMMERCIAL

## 2024-10-24 PROCEDURE — 76818 FETAL BIOPHYS PROFILE W/NST: CPT | Mod: 59

## 2024-10-24 PROCEDURE — 76816 OB US FOLLOW-UP PER FETUS: CPT

## 2024-10-24 PROCEDURE — 0502F SUBSEQUENT PRENATAL CARE: CPT

## 2024-10-28 ENCOUNTER — APPOINTMENT (OUTPATIENT)
Dept: OBGYN | Facility: CLINIC | Age: 29
End: 2024-10-28
Payer: COMMERCIAL

## 2024-10-28 PROCEDURE — 76818 FETAL BIOPHYS PROFILE W/NST: CPT | Mod: 59

## 2024-10-28 PROCEDURE — 0502F SUBSEQUENT PRENATAL CARE: CPT

## 2024-10-28 PROCEDURE — 76816 OB US FOLLOW-UP PER FETUS: CPT

## 2024-10-30 ENCOUNTER — APPOINTMENT (OUTPATIENT)
Dept: OBGYN | Facility: CLINIC | Age: 29
End: 2024-10-30

## 2024-11-04 ENCOUNTER — INPATIENT (INPATIENT)
Facility: HOSPITAL | Age: 29
LOS: 1 days | Discharge: HOME | DRG: 833 | End: 2024-11-06
Attending: STUDENT IN AN ORGANIZED HEALTH CARE EDUCATION/TRAINING PROGRAM | Admitting: STUDENT IN AN ORGANIZED HEALTH CARE EDUCATION/TRAINING PROGRAM
Payer: COMMERCIAL

## 2024-11-04 VITALS — HEIGHT: 61 IN

## 2024-11-04 DIAGNOSIS — O36.5930 MATERNAL CARE FOR OTHER KNOWN OR SUSPECTED POOR FETAL GROWTH, THIRD TRIMESTER, NOT APPLICABLE OR UNSPECIFIED: ICD-10-CM

## 2024-11-04 LAB
ANISOCYTOSIS BLD QL: SIGNIFICANT CHANGE UP
BASOPHILS # BLD AUTO: 0.09 K/UL — SIGNIFICANT CHANGE UP (ref 0–0.2)
BASOPHILS NFR BLD AUTO: 0.9 % — SIGNIFICANT CHANGE UP (ref 0–2)
BLD GP AB SCN SERPL QL: NEGATIVE — SIGNIFICANT CHANGE UP
DACRYOCYTES BLD QL SMEAR: SLIGHT — SIGNIFICANT CHANGE UP
EOSINOPHIL # BLD AUTO: 0 K/UL — SIGNIFICANT CHANGE UP (ref 0–0.5)
EOSINOPHIL NFR BLD AUTO: 0 % — SIGNIFICANT CHANGE UP (ref 0–6)
HCT VFR BLD CALC: 28.8 % — LOW (ref 34.5–45)
HGB BLD-MCNC: 8 G/DL — LOW (ref 11.5–15.5)
LYMPHOCYTES # BLD AUTO: 1.69 K/UL — SIGNIFICANT CHANGE UP (ref 1–3.3)
LYMPHOCYTES # BLD AUTO: 16.8 % — SIGNIFICANT CHANGE UP (ref 13–44)
MACROCYTES BLD QL: SLIGHT — SIGNIFICANT CHANGE UP
MANUAL SMEAR VERIFICATION: SIGNIFICANT CHANGE UP
MCHC RBC-ENTMCNC: 18.5 PG — LOW (ref 27–34)
MCHC RBC-ENTMCNC: 27.8 G/DL — LOW (ref 32–36)
MCV RBC AUTO: 66.5 FL — LOW (ref 80–100)
METAMYELOCYTES # FLD: 0.9 % — HIGH (ref 0–0)
MICROCYTES BLD QL: SIGNIFICANT CHANGE UP
MONOCYTES # BLD AUTO: 0.09 K/UL — SIGNIFICANT CHANGE UP (ref 0–0.9)
MONOCYTES NFR BLD AUTO: 0.9 % — LOW (ref 2–14)
MYELOCYTES NFR BLD: 2.6 % — HIGH (ref 0–0)
NEUTROPHILS # BLD AUTO: 7.83 K/UL — HIGH (ref 1.8–7.4)
NEUTROPHILS NFR BLD AUTO: 77.9 % — HIGH (ref 43–77)
OVALOCYTES BLD QL SMEAR: SLIGHT — SIGNIFICANT CHANGE UP
PLAT MORPH BLD: ABNORMAL
PLATELET # BLD AUTO: 282 K/UL — SIGNIFICANT CHANGE UP (ref 150–400)
POIKILOCYTOSIS BLD QL AUTO: SLIGHT — SIGNIFICANT CHANGE UP
POLYCHROMASIA BLD QL SMEAR: SLIGHT — SIGNIFICANT CHANGE UP
RBC # BLD: 4.33 M/UL — SIGNIFICANT CHANGE UP (ref 3.8–5.2)
RBC # FLD: 18.4 % — HIGH (ref 10.3–14.5)
RBC BLD AUTO: ABNORMAL
RH IG SCN BLD-IMP: POSITIVE — SIGNIFICANT CHANGE UP
WBC # BLD: 10.05 K/UL — SIGNIFICANT CHANGE UP (ref 3.8–10.5)
WBC # FLD AUTO: 10.05 K/UL — SIGNIFICANT CHANGE UP (ref 3.8–10.5)

## 2024-11-04 RX ORDER — FAMOTIDINE 10 MG/ML
20 INJECTION INTRAVENOUS ONCE
Refills: 0 | Status: COMPLETED | OUTPATIENT
Start: 2024-11-04 | End: 2024-11-04

## 2024-11-04 RX ORDER — CHLORHEXIDINE GLUCONATE 40 MG/ML
1 SOLUTION TOPICAL DAILY
Refills: 0 | Status: DISCONTINUED | OUTPATIENT
Start: 2024-11-04 | End: 2024-11-05

## 2024-11-04 RX ORDER — OXYTOCIN IN D5W-0.2% SODIUM CL 15/250 ML
167 PLASTIC BAG, INJECTION (ML) INTRAVENOUS
Qty: 30 | Refills: 0 | Status: COMPLETED | OUTPATIENT
Start: 2024-11-04 | End: 2024-11-05

## 2024-11-04 RX ORDER — CITRIC ACID/SODIUM CITRATE 334-500MG
15 SOLUTION, ORAL ORAL EVERY 6 HOURS
Refills: 0 | Status: DISCONTINUED | OUTPATIENT
Start: 2024-11-04 | End: 2024-11-05

## 2024-11-04 RX ORDER — INFLUENZ VIR VAC TV P-SURF2003 15MCG/.5ML
0.5 SYRINGE (ML) INTRAMUSCULAR ONCE
Refills: 0 | Status: DISCONTINUED | OUTPATIENT
Start: 2024-11-04 | End: 2024-11-06

## 2024-11-04 RX ADMIN — FAMOTIDINE 20 MILLIGRAM(S): 10 INJECTION INTRAVENOUS at 22:14

## 2024-11-04 RX ADMIN — Medication 125 MILLILITER(S): at 20:16

## 2024-11-04 RX ADMIN — Medication 125 MILLILITER(S): at 20:30

## 2024-11-04 NOTE — OB RN PATIENT PROFILE - FALL HARM RISK - FALLEN IN PAST
----------Daily Progress Note----------    HISTORY OF PRESENT ILLNESS:  Patient is a 78y old Female who presents with a chief complaint of Seizures (08 Feb 2022 09:23)    Currently admitted to medicine with the primary diagnosis of Status epilepticus       Today is hospital day 6d.     INTERVAL HOSPITAL COURSE / OVERNIGHT EVENTS:    Patient was examined and seen at bedside. Pt says she slept well, no complaints overnight.    Review of Systems: Otherwise unremarkable     <<<<<PAST MEDICAL & SURGICAL HISTORY>>>>>  Hypertension    Dementia    Chronic GERD    History of TIA (transient ischemic attack)    Seizure disorder    Folate deficiency    Vitamin B12 deficiency    S/P AKA (above knee amputation), left    H/O left nephrectomy      ALLERGIES  adhesives (Unknown)  valproic acid (Unknown)      Home Medications:  folic acid 1 mg oral tablet: 1 tab(s) orally once a day (02 Feb 2022 18:24)  Lipitor 40 mg oral tablet: 1 tab(s) orally once a day (02 Feb 2022 18:24)  Metoprolol Tartrate 25 mg oral tablet: 1 tab(s) orally once a day (02 Feb 2022 18:24)  Norvasc 5 mg oral tablet: 1 tab(s) orally once a day (02 Feb 2022 18:24)        MEDICATIONS  STANDING MEDICATIONS  aspirin  chewable 81 milliGRAM(s) Oral daily  atorvastatin 40 milliGRAM(s) Oral at bedtime  cefTRIAXone   IVPB 2000 milliGRAM(s) IV Intermittent every 24 hours  chlorhexidine 2% Cloths 1 Application(s) Topical <User Schedule>  gabapentin 100 milliGRAM(s) Oral three times a day  heparin   Injectable 5000 Unit(s) SubCutaneous every 12 hours  lacosamide IVPB 200 milliGRAM(s) IV Intermittent every 12 hours  lactobacillus acidophilus 1 Tablet(s) Oral daily  metoprolol tartrate 25 milliGRAM(s) Oral two times a day  QUEtiapine 100 milliGRAM(s) Oral at bedtime  QUEtiapine 25 milliGRAM(s) Oral daily  senna 2 Tablet(s) Oral at bedtime  tamsulosin 0.4 milliGRAM(s) Oral at bedtime  vancomycin    Solution 125 milliGRAM(s) Oral every 12 hours    PRN MEDICATIONS  acetaminophen     Tablet .. 650 milliGRAM(s) Oral every 6 hours PRN  LORazepam   Injectable 2 milliGRAM(s) IV Push once PRN    VITALS:  T(F): 97  HR: 108  BP: 114/58  RR: 18  SpO2: 95%    <<<<<LABS>>>>>                        11.6   6.30  )-----------( 165      ( 08 Feb 2022 06:00 )             36.7     02-08    143  |  108  |  17  ----------------------------<  109<H>  4.4   |  22  |  0.9    Ca    10.4<H>      08 Feb 2022 06:00  Mg     1.9     02-08    TPro  6.5  /  Alb  3.8  /  TBili  0.2  /  DBili  x   /  AST  21  /  ALT  17  /  AlkPhos  123<H>  02-08            [id number]        <<<<<RADIOLOGY>>>>>    < from: US Renal (02.07.22 @ 11:39) >  PROCEDURE DATE:  02/07/2022          INTERPRETATION:  CLINICAL INFORMATION: Hydronephrosis.    COMPARISON: December 23, 2021    TECHNIQUE: Sonography of the kidneys and bladder.    FINDINGS:    Right kidney: 13 x 5.4 x 4.9 cm. Moderate hydronephrosis. Is seen. Renal   pelvic calculus is seen without change.    Left kidney: . The left kidney was removed.    Urinary bladder: Right-sided ureteral jet is seen. The bladder walls are   not thickened. Prevoid volume of approximately 130 cc. The patient did   not have the need to void.      IMPRESSION:    Right-sided nephrolithiasis with hydronephrosis, without change.    Status post left nephrectomy.    < end of copied text >      <<<<<PHYSICAL EXAM>>>>>  GENERAL: resting comfortably in bed  PULMONARY: decreased bibasilar breath sounds  CARDIOVASCULAR: Regular rate and rhythm, S1-S2, no murmurs  GASTROINTESTINAL: Soft, non-tender, non-distended, no guarding.  NEUROLOGIC: AAOX2      ----------------------------------------------------------------------------------------------------------------------------------------------------------------------------------------------- No

## 2024-11-04 NOTE — OB PROVIDER H&P - HISTORY OF PRESENT ILLNESS
HPI: 29yoF  @38+4 presents for IOL for IUGR (6%, AC 4%, UAD: wnl). +FM. -LOF. -CTXs. -VB. Pt denies any other concerns.    – PNC: IUGR as above. GBS neg.  EFW 2800 g extrapolated from office sono  – OBHx:  x1 FT 6#9  – GynHx: denies  – PMH: denies  – PSH: denies  – Psych: denies   – Social: denies   – Meds: PNV   – Allergies: PCN (hives)  – Will accept blood transfusions? Yes

## 2024-11-04 NOTE — OB PROVIDER H&P - NS_SCHEDINDUC_OBGYN_ALL_OB
IUGR 48 yo male is scheduled for "Laparoscopic sleeve gastrectomy w/upper endoscopy req PA" on 4/30/18 with Love Mireles MD.

## 2024-11-04 NOTE — OB PROVIDER H&P - NSHPPHYSICALEXAM_GEN_ALL_CORE
Gen: resting comfortably in bed  CV: RRR  Pulm: breathing comfortably on RA  Abd: gravid, nontender  Extr: moving all extremities with ease    – VE: 1/50/-3  – FHT: baseline 135, mod variability, +accels, -decels  – [place]: q6min  – EFW: 2800g extrapolated from office sono  – Sono: vertex

## 2024-11-04 NOTE — OB PROVIDER H&P - ASSESSMENT
30 yo  @38+4 here for IOL for IUGR (6%, AC: 4%, UAD: wnl). Denies any other complaints. GBS neg    Plan  1. Admit to LND. Routine Labs. IVF.  2. IOL w/ PO, CB, pit  3. Fetus: cat 1 tracing. VTX. EFW 2800g extrapolated by office sono. Continuous EFM. Sono. No concerns.  4. Prenatal issues: none  5. GBS neg  6. Pain: IV pain meds/epidural PRN    Plan as per Dr. Vicente Larkin PGY1

## 2024-11-04 NOTE — OB RN PATIENT PROFILE - BREAST MILK SUPPORTS STABLE NEWBORN BLOOD SUGAR
GI Consult Note:    Name: Annetta Ragsdale  MRN: [id number]     [place]: [de-identified]  Room: 2003/2003-02    Admit Date: 10/7/2021  PCP: Nate Crawley MD    Physician Requesting Consult: Karishma Velazquez MD     Reason for Consult: Persistent nausea vomiting, abdominal pain    Chief Complaint:     Chief Complaint   Patient presents with    Emesis     onset this morning. clear emesis in basin. History Obtained From:     patient, electronic medical record, nursing staff    History of Present Illness:      Annetta Ragsdale is a  44 y.o.  female who presents with Emesis (onset this morning. clear emesis in basin. )      Symptoms:  Patient seen at [facility].    Patient is admitted from the emergency department with a history of persistent nausea vomiting. Patient has the symptom in the last 3 days. Soon after eating food she gets severe nausea and regurgitates the food, and also has emesis. No hematemesis. Emesis contained gastric juice. No prior history of similar symptoms. Has abdominal pain. The pain is mostly in the mid abdomen, spreads all over the abdomen. No radiation. Not related to micturition. Has mild abdominal bloating and cramps as well. Patient does have 1 or 2 bowel movements a day semiformed stools. No constipation or diarrhea. No melena or hematochezia. Recently patient has anorexia. She states that she is losing weight not able to quantify. No fever chills. Denies alcoholism. She is not a smoker. Does not take NSAIDs. No prior history of liver disease pancreatitis etc.    She used to use marijuana in the past however in the last 10 to 15 years she stopped marijuana use. She does have depression. Medications reviewed and she is on multiple medications reviewed and discussed. Has a past history of chronic GERD, IBS, asthma and seizure disorder.   Past Medical History:     Past Medical History:   Diagnosis Date    GERD (gastroesophageal reflux disease)     Irritable bowel syndrome without diarrhea 2016    Moderate persistent asthma without complication     Moderate persistent asthma without complication     Seizures ([address]     2013, 14        Past Surgical History:     Past Surgical History:   Procedure Laterality Date     SECTION          TUBAL LIGATION              Medications Prior to Admission:       Prior to Admission medications    Medication Sig Start Date End Date Taking? Authorizing Provider   albuterol sulfate  (90 Base) MCG/ACT inhaler INHALE TWO PUFFS BY MOUTH EVERY 6 HOURS AS NEEDED FOR WHEEZING 10/7/21   Helen Oconnor MD   ondansetron (ZOFRAN ODT) 4 MG disintegrating tablet Take 1 tablet by mouth every 8 hours as needed for Nausea 21   Maisha Burns MD   dicyclomine (BENTYL) 10 MG capsule Take 1 capsule by mouth every 6 hours as needed (cramps) 21   Maisha Burns MD   potassium chloride (KLOR-CON M) 20 MEQ extended release tablet Take 1 tablet by mouth daily for 5 days 21  Maisha Burns MD   levETIRAcetam (KEPPRA) 750 MG tablet TAKE ONE TABLET BY MOUTH TWICE A DAY 21   Helen Oconnor MD   famotidine (PEPCID) 20 MG tablet TAKE ONE TABLET BY MOUTH TWICE A DAY 21   Helen Oconnor MD   vitamin D3 (CHOLECALCIFEROL) 25 MCG (1000 UT) TABS tablet ONE BY MOUTH DAILY 21   Helen Oconnor MD   ondansetron (ZOFRAN ODT) 4 MG disintegrating tablet Take 1 tablet by mouth every 8 hours as needed for Nausea 3/16/21   Ellie Dias PA-C   metoclopramide (REGLAN) 10 MG tablet Take 1 tablet by mouth 3 times daily WARNING:  May cause drowsiness. May impair ability to operate vehicles or machinery. Do not use in combination with alcohol.  3/16/21 4/15/21  Ellie Dias PA-C   dicyclomine (BENTYL) 20 MG tablet Take 1 tablet by mouth 4 times daily for 10 days 3/16/21 3/26/21  Ellie Dias PA-C        Allergies: Omeprazole    Social History:     Tobacco:    reports that she quit smoking about 7 years ago. Her smoking use included cigarettes. She has a 10.00 pack-year smoking history. She has never used smokeless tobacco.  Alcohol:      reports no history of alcohol use. Drug Use: reports current drug use. Drug: Marijuana. Family History:     Family History   Problem Relation Age of Onset    High Cholesterol Mother     Birth Defects Neg Hx     Diabetes Neg Hx        Review of Systems:     Review of Systems   Constitutional: Positive for appetite change, fatigue and unexpected weight change. Negative for fever. HENT: Negative for mouth sores, sore throat, trouble swallowing and voice change. Eyes: Negative. Respiratory: Negative for cough, choking, shortness of breath and wheezing. Cardiovascular: Negative for chest pain, palpitations and leg swelling. Gastrointestinal: Positive for abdominal pain, diarrhea, nausea and vomiting. Negative for blood in stool and constipation. Endocrine: Negative for polyphagia and polyuria. Genitourinary: Negative for difficulty urinating, frequency, hematuria, pelvic pain, urgency and vaginal bleeding. Musculoskeletal: Positive for arthralgias and myalgias. Negative for back pain, gait problem and joint swelling. Skin: Negative for color change, pallor and rash. Allergic/Immunologic: Negative for food allergies. Neurological: Positive for dizziness and weakness. Negative for seizures, light-headedness and headaches. Hematological: Negative for adenopathy. Does not bruise/bleed easily. Psychiatric/Behavioral: Positive for behavioral problems. Negative for sleep disturbance. The patient is nervous/anxious.         Code Status:  Full Code    Physical Exam:     Vitals:  /79   Pulse 68   Temp 98.1 °F (36.7 °C) (Oral)   Resp 16   Ht 5' 2\" (1.575 m)   Wt 117 lb 12.8 oz (53.4 kg)   LMP 2021   SpO2 98%   BMI 21.55 kg/m²   Temp (24hrs), Av.3 °F (36.8 °C), Min:98.1 °F (36.7 °C), Max:98.4 °F (36.9 °C)      Physical Exam  Vitals and nursing note reviewed. Constitutional:       Appearance: She is well-developed. Comments: Patient has signs of malnutrition. Thinly built. HENT:      Head: Normocephalic and atraumatic. Eyes:      General: No scleral icterus. Conjunctiva/sclera: Conjunctivae normal.      Pupils: Pupils are equal, round, and reactive to light. Neck:      Thyroid: No thyromegaly. Vascular: No hepatojugular reflux or JVD. Trachea: No tracheal deviation. Cardiovascular:      Rate and Rhythm: Normal rate and regular rhythm. Heart sounds: Normal heart sounds. Pulmonary:      Effort: Pulmonary effort is normal. No respiratory distress. Breath sounds: Normal breath sounds. No wheezing or rales. Abdominal:      General: Bowel sounds are normal. There is no distension. Palpations: Abdomen is soft. There is no hepatomegaly or mass. Tenderness: There is no abdominal tenderness. There is no rebound. Hernia: No hernia is present. Comments: Patient experiences hypersensitivity of skin on palpation and also percussion. Musculoskeletal:         General: No tenderness. Cervical back: Normal range of motion and neck supple. Comments: No joint swelling   Lymphadenopathy:      Cervical: No cervical adenopathy. Skin:     General: Skin is warm. Findings: No bruising, ecchymosis, erythema or rash. Neurological:      Mental Status: She is alert and oriented to person, place, and time. Cranial Nerves: No cranial nerve deficit. Psychiatric:         Thought Content:  Thought content normal.       Data:   CBC:   Lab Results   Component Value Date    WBC 8.6 10/08/2021    RBC 4.07 10/08/2021    RBC 4.75 03/21/2012    HGB 12.3 10/08/2021    HCT 34.3 10/08/2021    MCV 84.3 10/08/2021    MCH 30.2 10/08/2021    MCHC 35.9 10/08/2021    RDW 11.9 10/08/2021     10/08/2021      Seizure ([facility] [R56.9] 03/26/2012       Plan:     1. Patient has persistent nausea vomiting and nonlocalized abdominal pain etiology not clear. 2. Basing on the past history she has history of IBS. 3. Need to evaluate possibility of gallbladder colic/disease and also gastric pathology. 4. Advised ultrasound of the gallbladder and EGD. 5. All the labs reviewed and are within normal limits. 6. Discussed with the patient and nursing staff regarding plan of management, they understood and agreed. 7. My colleague will be arranging the EGD for tomorrow. Thank you for allowing me to participate in the care of your patient. Please feel free to contact me with anyquestions or concerns. Electronically signed by Mireya Webster MD on 10/8/2021 at 10:28 PM     Copy sent to Dr. Yamile Lazo MD    Note is dictated utilizing voice recognition software. Unfortunately this leads to occasional typographical errors. Please contact our office if you have any questions. Statement Selected

## 2024-11-05 LAB — T PALLIDUM AB TITR SER: NEGATIVE — SIGNIFICANT CHANGE UP

## 2024-11-05 PROCEDURE — 59400 OBSTETRICAL CARE: CPT

## 2024-11-05 PROCEDURE — 88307 TISSUE EXAM BY PATHOLOGIST: CPT | Mod: 26

## 2024-11-05 RX ORDER — OXYTOCIN IN D5W-0.2% SODIUM CL 15/250 ML
4 PLASTIC BAG, INJECTION (ML) INTRAVENOUS
Qty: 30 | Refills: 0 | Status: DISCONTINUED | OUTPATIENT
Start: 2024-11-05 | End: 2024-11-05

## 2024-11-05 RX ORDER — IBUPROFEN 200 MG
600 TABLET ORAL EVERY 6 HOURS
Refills: 0 | Status: COMPLETED | OUTPATIENT
Start: 2024-11-05 | End: 2025-10-04

## 2024-11-05 RX ORDER — DIPHENHYDRAMINE HCL 12.5MG/5ML
25 ELIXIR ORAL EVERY 6 HOURS
Refills: 0 | Status: DISCONTINUED | OUTPATIENT
Start: 2024-11-05 | End: 2024-11-06

## 2024-11-05 RX ORDER — OXYTOCIN IN D5W-0.2% SODIUM CL 15/250 ML
167 PLASTIC BAG, INJECTION (ML) INTRAVENOUS
Qty: 30 | Refills: 0 | Status: DISCONTINUED | OUTPATIENT
Start: 2024-11-05 | End: 2024-11-06

## 2024-11-05 RX ORDER — KETOROLAC TROMETHAMINE 30 MG/ML
30 INJECTION INTRAMUSCULAR; INTRAVENOUS EVERY 6 HOURS
Refills: 0 | Status: DISCONTINUED | OUTPATIENT
Start: 2024-11-05 | End: 2024-11-06

## 2024-11-05 RX ORDER — SODIUM CHLORIDE 9 MG/ML
3 INJECTION, SOLUTION INTRAMUSCULAR; INTRAVENOUS; SUBCUTANEOUS EVERY 8 HOURS
Refills: 0 | Status: DISCONTINUED | OUTPATIENT
Start: 2024-11-05 | End: 2024-11-06

## 2024-11-05 RX ORDER — ONDANSETRON HYDROCHLORIDE 2 MG/ML
4 INJECTION, SOLUTION INTRAMUSCULAR; INTRAVENOUS ONCE
Refills: 0 | Status: COMPLETED | OUTPATIENT
Start: 2024-11-05 | End: 2024-11-05

## 2024-11-05 RX ORDER — OXYCODONE HYDROCHLORIDE 30 MG/1
5 TABLET ORAL
Refills: 0 | Status: DISCONTINUED | OUTPATIENT
Start: 2024-11-05 | End: 2024-11-06

## 2024-11-05 RX ORDER — DIBUCAINE 1 %
1 OINTMENT (GRAM) TOPICAL EVERY 6 HOURS
Refills: 0 | Status: DISCONTINUED | OUTPATIENT
Start: 2024-11-05 | End: 2024-11-06

## 2024-11-05 RX ORDER — OXYCODONE HYDROCHLORIDE 30 MG/1
5 TABLET ORAL ONCE
Refills: 0 | Status: DISCONTINUED | OUTPATIENT
Start: 2024-11-05 | End: 2024-11-06

## 2024-11-05 RX ORDER — HYDROCORTISONE 1 %
1 OINTMENT (GRAM) TOPICAL EVERY 6 HOURS
Refills: 0 | Status: DISCONTINUED | OUTPATIENT
Start: 2024-11-05 | End: 2024-11-06

## 2024-11-05 RX ORDER — IBUPROFEN 200 MG
600 TABLET ORAL EVERY 6 HOURS
Refills: 0 | Status: DISCONTINUED | OUTPATIENT
Start: 2024-11-05 | End: 2024-11-06

## 2024-11-05 RX ORDER — PRAMOXINE HCL 1 %
1 CREAM (GRAM) RECTAL EVERY 4 HOURS
Refills: 0 | Status: DISCONTINUED | OUTPATIENT
Start: 2024-11-05 | End: 2024-11-06

## 2024-11-05 RX ORDER — ACETAMINOPHEN 500 MG
975 TABLET ORAL
Refills: 0 | Status: DISCONTINUED | OUTPATIENT
Start: 2024-11-05 | End: 2024-11-06

## 2024-11-05 RX ORDER — PRENATAL VIT/IRON FUM/FOLIC AC 60 MG-1 MG
1 TABLET ORAL DAILY
Refills: 0 | Status: DISCONTINUED | OUTPATIENT
Start: 2024-11-05 | End: 2024-11-06

## 2024-11-05 RX ORDER — MAGNESIUM HYDROXIDE 1200 MG/15ML
30 SUSPENSION ORAL
Refills: 0 | Status: DISCONTINUED | OUTPATIENT
Start: 2024-11-05 | End: 2024-11-06

## 2024-11-05 RX ORDER — CLOSTRIDIUM TETANI TOXOID ANTIGEN (FORMALDEHYDE INACTIVATED), CORYNEBACTERIUM DIPHTHERIAE TOXOID ANTIGEN (FORMALDEHYDE INACTIVATED), BORDETELLA PERTUSSIS TOXOID ANTIGEN (GLUTARALDEHYDE INACTIVATED), BORDETELLA PERTUSSIS FILAMENTOUS HEMAGGLUTININ ANTIGEN (FORMALDEHYDE INACTIVATED), BORDETELLA PERTUSSIS PERTACTIN ANTIGEN, AND BORDETELLA PERTUSSIS FIMBRIAE 2/3 ANTIGEN 5; 2; 2.5; 5; 3; 5 [LF]/.5ML; [LF]/.5ML; UG/.5ML; UG/.5ML; UG/.5ML; UG/.5ML
0.5 INJECTION, SUSPENSION INTRAMUSCULAR ONCE
Refills: 0 | Status: DISCONTINUED | OUTPATIENT
Start: 2024-11-05 | End: 2024-11-06

## 2024-11-05 RX ORDER — BENZOCAINE 200 MG/G
1 GEL ORAL EVERY 6 HOURS
Refills: 0 | Status: DISCONTINUED | OUTPATIENT
Start: 2024-11-05 | End: 2024-11-06

## 2024-11-05 RX ORDER — MODIFIED LANOLIN
1 OINTMENT (GRAM) TOPICAL EVERY 6 HOURS
Refills: 0 | Status: DISCONTINUED | OUTPATIENT
Start: 2024-11-05 | End: 2024-11-06

## 2024-11-05 RX ORDER — SIMETHICONE 80 MG/1
80 TABLET, CHEWABLE ORAL EVERY 4 HOURS
Refills: 0 | Status: DISCONTINUED | OUTPATIENT
Start: 2024-11-05 | End: 2024-11-06

## 2024-11-05 RX ADMIN — Medication 4 MILLIUNIT(S)/MIN: at 05:36

## 2024-11-05 RX ADMIN — Medication 167 MILLIUNIT(S)/MIN: at 08:55

## 2024-11-05 RX ADMIN — Medication 975 MILLIGRAM(S): at 14:40

## 2024-11-05 RX ADMIN — Medication 975 MILLIGRAM(S): at 15:20

## 2024-11-05 RX ADMIN — Medication 600 MILLIGRAM(S): at 18:14

## 2024-11-05 RX ADMIN — Medication 600 MILLIGRAM(S): at 23:36

## 2024-11-05 RX ADMIN — Medication 975 MILLIGRAM(S): at 21:42

## 2024-11-05 RX ADMIN — Medication 975 MILLIGRAM(S): at 22:25

## 2024-11-05 RX ADMIN — KETOROLAC TROMETHAMINE 30 MILLIGRAM(S): 30 INJECTION INTRAMUSCULAR; INTRAVENOUS at 12:20

## 2024-11-05 RX ADMIN — Medication 1000 MILLILITER(S): at 10:44

## 2024-11-05 RX ADMIN — ONDANSETRON HYDROCHLORIDE 4 MILLIGRAM(S): 2 INJECTION, SOLUTION INTRAMUSCULAR; INTRAVENOUS at 06:02

## 2024-11-05 RX ADMIN — Medication 600 MILLIGRAM(S): at 17:21

## 2024-11-05 NOTE — OB PROVIDER LABOR PROGRESS NOTE - NS_OBIHIFHRDETAILS_OBGYN_ALL_OB_FT
130 baseline, mod variability, -accel, -decel  [place]: Q6 min
130/mod/+accels/occ early decels Cat I
140 / mod joseluis / -accels / recurrent variable decelerations

## 2024-11-05 NOTE — OB RN DELIVERY SUMMARY - NSSELHIDDEN_OBGYN_ALL_OB_FT
[NS_DeliveryAttending1_OBGYN_ALL_OB_FT:[id number]],[NS_DeliveryRN_OBGYN_ALL_OB_FT:[id number]=]

## 2024-11-05 NOTE — OB PROVIDER LABOR PROGRESS NOTE - NS_SUBJECTIVE/OBJECTIVE_OBGYN_ALL_OB_FT
Patient seen and evaluated for recurrent variables. Patient found to be highly uncomfortable with contractions, reporting urinary urgency with the contraction.    ICU Vital Signs Last 24 Hrs  T(C): 36.7 (05 Nov 2024 03:32), Max: 36.9 (04 Nov 2024 20:11)  T(F): 98.06 (05 Nov 2024 03:32), Max: 98.42 (04 Nov 2024 20:11)  HR: 97 (05 Nov 2024 03:42) (69 - 118)  BP: 103/61 (05 Nov 2024 03:37) (91/50 - 112/58)  RR: 18 (04 Nov 2024 22:43) (18 - 18)  SpO2: 99% (05 Nov 2024 03:42) (98% - 100%)    O2 Parameters below as of 04 Nov 2024 20:38  Patient On (Oxygen Delivery Method): room air
Patient seen and evaluated for increasing discomfort.    ICU Vital Signs Last 24 Hrs  T(C): 36.7 (05 Nov 2024 03:32), Max: 36.9 (04 Nov 2024 20:11)  T(F): 98.06 (05 Nov 2024 03:32), Max: 98.42 (04 Nov 2024 20:11)  HR: 85 (05 Nov 2024 06:17) (69 - 118)  BP: 104/56 (05 Nov 2024 06:08) (87/57 - 112/58)  RR: 18 (04 Nov 2024 22:43) (18 - 18)  SpO2: 99% (05 Nov 2024 06:17) (98% - 100%)    O2 Parameters below as of 04 Nov 2024 20:38  Patient On (Oxygen Delivery Method): room air
pt is feeling pain  reports srom
Patient seen and evaluated at bedside for variable decelerations sp balloon.    ICU Vital Signs Last 24 Hrs  T(C): 36.9 (04 Nov 2024 22:43), Max: 36.9 (04 Nov 2024 20:11)  T(F): 98.42 (04 Nov 2024 22:43), Max: 98.42 (04 Nov 2024 20:11)  HR: 85 (05 Nov 2024 01:07) (69 - 118)  BP: 103/51 (05 Nov 2024 00:53) (93/52 - 112/58)  RR: 18 (04 Nov 2024 22:43) (18 - 18)  SpO2: 100% (05 Nov 2024 01:07) (98% - 100%)    O2 Parameters below as of 04 Nov 2024 20:38  Patient On (Oxygen Delivery Method): room air
Pt seen and examined at bedside for placement of cervical balloon. 60 cc NS in each balloon. Pt tolerated procedure well.

## 2024-11-05 NOTE — OB RN DELIVERY SUMMARY - NS_SEPSISRSKCALC_OBGYN_ALL_OB_FT
EOS calculated successfully. EOS Risk Factor: 0.5/1000 live births ([facility] national incidence); GA=38w5d; Temp=98.6; ROM=2.317; GBS='Negative'; Antibiotics='No antibiotics or any antibiotics < 2 hrs prior to birth'

## 2024-11-05 NOTE — OB PROVIDER LABOR PROGRESS NOTE - ASSESSMENT
28 yo  @38+4 here for IOL for IUGR    cw cytotec  cb in place  cont to monitor tracing    Cristiana Larkin PGY1
top off called  pitocin held until top off    Helga Zhang  OB attg
28yo  @38w5d admitted for IOL 2.2 IUGR (6%, AC 4%).    - GBS neg  - EFM / TOCO : Resuscitative measures PRN: patient repositioned to left lateral side after exam and variables occurred, resolved on right lateral side.  - Epi for pain: Anesthesia called for top off  - Exam: bag felt to be intact on exam  - Expect     DW Dr. Vicente CONSTANTINOC  
30yo  @38w5d admitted for IOL 2.2 IUGR (6%, AC 4%).    - GBS neg  - EFM / TOCO : Resuscitative measures PRN: patient repositioned to left lateral side, 1L bolus started  - Epi for pain  - Exam: 4cm, bag felt to be intact on exam  - If variables to not improve, consider AROM/IUPC.    Corina Jordan PA-C
28yo  @38w5d admitted for IOL 2.2 IUGR (6%, AC 4%).    - GBS neg  - EFM / TOCO: Resuscitative measures PRN: patient repositioned and variables improved  - Epi for pain: anesthesia called for top off  - Small amount of clear fluid seen, unclear if ruptured at this time  - Plan to begin Pit@4am  - Expect     Corina Jordan PA-C

## 2024-11-05 NOTE — OB NEONATOLOGY/PEDIATRICIAN DELIVERY SUMMARY - NSPEDSNEONOTESA_OBGYN_ALL_OB_FT
Baby girl born cephalic at 38 5/7 wks via  to a 30 y/o  mother who is O+ blood type, HBsAg neg, Hep C neg, HIV neg, rubella immune, RPR neg, GBS neg as of 10/16. No significant prenatal or materna history. SROM 0700 with clear fluids. Code 100 called as per RN due to poor color and resp effort. Infant emerged with tight NC x1. Infant was placed on warmer, dried and stimulated. CPAP started by RN. Peds arrived at 2 mins of life. Infant simulated, good cry and tone noted. Infant placed to skin. APGARS of 5 / 9. Mom would like to breast feed and No birth dose of Hep B.

## 2024-11-05 NOTE — OB PROVIDER DELIVERY SUMMARY - NSPROVIDERDELIVERYNOTE_OBGYN_ALL_OB_FT
IOL for IUGR.  Iol with cytotec, balloon and pitocin.  SROM and progressed to FD.  Pushed to undergo ,  After delivery of fetal head, tight nuchal cord with compound presentation.  Given compound presentation, unable to reduce or cut cord at perineum, and delivered through.  Apgars 5,9.  5 lb 6 oz, baby girl.  Second degree laceration repaired with 2.0 vicryl rapide.  Sphincter intact.       Helga Zhang   OB attg

## 2024-11-06 ENCOUNTER — TRANSCRIPTION ENCOUNTER (OUTPATIENT)
Age: 29
End: 2024-11-06

## 2024-11-06 VITALS
SYSTOLIC BLOOD PRESSURE: 102 MMHG | RESPIRATION RATE: 18 BRPM | TEMPERATURE: 98 F | DIASTOLIC BLOOD PRESSURE: 66 MMHG | OXYGEN SATURATION: 99 % | HEART RATE: 90 BPM

## 2024-11-06 LAB
HCT VFR BLD CALC: 23.5 % — LOW (ref 34.5–45)
HGB BLD-MCNC: 6.6 G/DL — CRITICAL LOW (ref 11.5–15.5)

## 2024-11-06 RX ADMIN — Medication 600 MILLIGRAM(S): at 06:15

## 2024-11-06 RX ADMIN — Medication 600 MILLIGRAM(S): at 00:11

## 2024-11-06 RX ADMIN — Medication 975 MILLIGRAM(S): at 09:40

## 2024-11-06 RX ADMIN — Medication 600 MILLIGRAM(S): at 12:30

## 2024-11-06 RX ADMIN — Medication 1 TABLET(S): at 12:00

## 2024-11-06 RX ADMIN — Medication 600 MILLIGRAM(S): at 05:26

## 2024-11-06 RX ADMIN — Medication 975 MILLIGRAM(S): at 03:18

## 2024-11-06 RX ADMIN — Medication 975 MILLIGRAM(S): at 04:00

## 2024-11-06 RX ADMIN — Medication 975 MILLIGRAM(S): at 10:10

## 2024-11-06 RX ADMIN — Medication 600 MILLIGRAM(S): at 12:00

## 2024-11-06 NOTE — DISCHARGE NOTE OB - CARE PROVIDER_API CALL
Helga Zhang  Obstetrics and Gynecology  [address] [place] [address], [address]  [place] [address]  Phone: [phone]  Fax: [phone]  Follow Up Time:

## 2024-11-06 NOTE — PROVIDER CONTACT NOTE (OTHER) - SITUATION
Patient complaining of a headache, feels relief from the headache when shes laying flat and feels the headache when she sits up.

## 2024-11-06 NOTE — DISCHARGE NOTE OB - FINANCIAL ASSISTANCE
[facility] provides services at a reduced cost to those who are determined to be eligible through [facility]’s financial assistance program. Information regarding [facility]’s financial assistance program can be found by going to [email or web address] or by calling [phone].

## 2024-11-06 NOTE — CHART NOTE - NSCHARTNOTEFT_GEN_A_CORE
Provider made aware at this time by nursing that pt has headahce, likely spinal in nature given that it gets worse with standing up and is made better with laying down. Pt given Tylenol and Motrin at 9p and 12a. Pt currently asleep at this time. Vitals currently stable.   Pt otherwise with no complaints.    - cont to monitor symptoms  - re-evaluated at bedside if pt continues to experience symptoms    Cristiana Larkin PGY1
Patient seen and examined at bedside due to asymptomatic hypotension. Patient is PPD0 from , . H/H: 8.0/28.8   BPs since delivery 70s-100s/50s-60s, HR 90s-110s. Patient received 1L fluid bolus. Patient examined and reports feeling weak and fatigued. Denies dizziness/lightheadedness, CP/SOB/palpitations. Patient spontaneously voiding.     Patient in NAD and resting comfortably in bed.   No blood expressed on fundal check. Uterus firm however deviated to right.   Bedside US revealed thin endometrial stripe however moderately full bladder.     BP while in room 106/58 and patient still declining symptoms.   Encouraged patient to use bed pan for urination prn.     Patient seen and examined w/ M. Rajani, PGY4   Meaghan, PGY1

## 2024-11-06 NOTE — PROGRESS NOTE ADULT - SUBJECTIVE AND OBJECTIVE BOX
OB Progress Note:  PPD#1    S: 28yo  PPD#1 s/p . Overnight patient complaining of positional HA. Patient feels well this am however still with slight HA. Did not report it being positional this morning. Denies vision changes. Denies CP/SOB. Denies lightheadedness/dizziness. Denies N/V.  Pain is well controlled. She is tolerating a regular diet and passing flatus. She is voiding spontaneously, and ambulating without difficulty.     O:  Vitals:  Vital Signs Last 24 Hrs  T(C): 36.6 (2024 06:21), Max: 37.1 (2024 12:30)  T(F): 97.9 (2024 06:21), Max: 98.8 (2024 12:30)  HR: 100 (2024 06:21) (81 - 121)  BP: 106/68 (2024 06:21) (76/51 - 114/56)  BP(mean): --  RR: 18 (2024 06:21) (16 - 19)  SpO2: 98% (2024 06:21) (90% - 100%)    Parameters below as of 2024 06:21  Patient On (Oxygen Delivery Method): room air        MEDICATIONS  (STANDING):  acetaminophen     Tablet .. 975 milliGRAM(s) Oral <User Schedule>  diphtheria/tetanus/pertussis (acellular) Vaccine (Adacel) 0.5 milliLiter(s) IntraMuscular once  ibuprofen  Tablet. 600 milliGRAM(s) Oral every 6 hours  influenza   Vaccine 0.5 milliLiter(s) IntraMuscular once  ketorolac   Injectable 30 milliGRAM(s) IV Push every 6 hours  oxytocin Infusion 167 milliUNIT(s)/Min (167 mL/Hr) IV Continuous <Continuous>  prenatal multivitamin 1 Tablet(s) Oral daily  sodium chloride 0.9% lock flush 3 milliLiter(s) IV Push every 8 hours      Labs:  Blood type: O Positive  Rubella IgG: RPR: Negative                          8.0[L]   10.05 >-----------< 282    ( 11-04 @ 20:30 )             28.8[L]          Physical Exam:  General: NAD  Pulm: breathing comfortably on room air   Abdomen: soft, non-tender, non-distended, fundus firm  Vaginal: Lochia wnl

## 2024-11-06 NOTE — DISCHARGE NOTE OB - PATIENT PORTAL LINK FT
You can access the FollowMyHealth Patient Portal offered by [facility] by registering at the following website: [email or web address]. By joining [employer]’s FollowMyHealth portal, you will also be able to view your health information using other applications (apps) compatible with our system.

## 2024-11-06 NOTE — PROGRESS NOTE ADULT - ASSESSMENT
A/P: 30yo PPD#1 s/p . Patient is stable and doing well post-partum.   #Headache  - Pt reports improvement this am   - Tylenol and motrin prn   - Will CTM     #Postpartum   -   - H/H: .8  - Pain well controlled, continue current pain regimen  - Increase ambulation, SCDs when not ambulating  - Continue regular diet    Meaghan, PGY1

## 2024-11-07 ENCOUNTER — OUTPATIENT (OUTPATIENT)
Dept: OUTPATIENT SERVICES | Facility: HOSPITAL | Age: 29
LOS: 1 days | End: 2024-11-07
Payer: COMMERCIAL

## 2024-11-07 VITALS
DIASTOLIC BLOOD PRESSURE: 51 MMHG | SYSTOLIC BLOOD PRESSURE: 105 MMHG | OXYGEN SATURATION: 99 % | RESPIRATION RATE: 18 BRPM | HEART RATE: 82 BPM

## 2024-11-07 VITALS
DIASTOLIC BLOOD PRESSURE: 59 MMHG | SYSTOLIC BLOOD PRESSURE: 99 MMHG | TEMPERATURE: 98 F | HEART RATE: 90 BPM | RESPIRATION RATE: 18 BRPM | HEIGHT: 61 IN | OXYGEN SATURATION: 99 % | WEIGHT: 130.07 LBS

## 2024-11-07 PROCEDURE — 85014 HEMATOCRIT: CPT

## 2024-11-07 PROCEDURE — 86901 BLOOD TYPING SEROLOGIC RH(D): CPT

## 2024-11-07 PROCEDURE — 86780 TREPONEMA PALLIDUM: CPT

## 2024-11-07 PROCEDURE — 86900 BLOOD TYPING SEROLOGIC ABO: CPT

## 2024-11-07 PROCEDURE — 88307 TISSUE EXAM BY PATHOLOGIST: CPT

## 2024-11-07 PROCEDURE — 86850 RBC ANTIBODY SCREEN: CPT

## 2024-11-07 PROCEDURE — 59050 FETAL MONITOR W/REPORT: CPT

## 2024-11-07 PROCEDURE — 85018 HEMOGLOBIN: CPT

## 2024-11-07 PROCEDURE — 59025 FETAL NON-STRESS TEST: CPT

## 2024-11-07 PROCEDURE — 85025 COMPLETE CBC W/AUTO DIFF WBC: CPT

## 2024-11-07 PROCEDURE — 36415 COLL VENOUS BLD VENIPUNCTURE: CPT

## 2024-11-07 RX ORDER — ACETAMINOPHEN 500 MG
1000 TABLET ORAL ONCE
Refills: 0 | Status: COMPLETED | OUTPATIENT
Start: 2024-11-07 | End: 2024-11-07

## 2024-11-07 RX ADMIN — Medication 400 MILLIGRAM(S): at 10:52

## 2024-11-07 NOTE — OB POSTPARTUM TRIAGE NOTE - ADDITIONAL INSTRUCTIONS
follow up with MD next appt; per DR Milad Haines x24 hours ; no heavy lifting ; call MD if s/s of headache return or any other issues

## 2024-11-07 NOTE — OB POSTPARTUM TRIAGE NOTE - NSPPNURSINGINSTR_OBGYN_ALL_OB_FT
d/c instructions given per Dr Stinson; pt aware no heavy lifting or motrin x24 hours; call MD for any other issues and any return of h/a; numbness in legs in extremities

## 2024-11-07 NOTE — PRE-ANESTHESIA EVALUATION ADULT - NSANTHADDINFOFT_GEN_ALL_CORE
Severe positional HA following epidural placement and  3 days ago. HA started 24 h after delivery, initially vague, now positional-worse upright, relieved when supine. pt anemic but unlikely to be cause of symptoms. likely PDPH. consented for epidural blood patch.

## 2024-11-07 NOTE — OB POSTPARTUM TRIAGE NOTE - NS_PPADDINFO_ALL_OB_FT
pt was discharged yesterday following a  ; pt was c/o headache at that time which imporves w/ lying down ; pt  expressed concern over pt " blood levels" at d/c.  Pt stated MD Dr Gil made her aware her blood levels were lower but was ok to go home.  h/h from yesterday 8.0/25.3; POC update given to dr Milad martinez who did not request repeat labs ; per DAGOBERTO LEON no lab work needed at this time pt was discharged yesterday following a  ; pt was c/o headache at that time which imporves w/ lying down ; pt  expressed concern over pt " blood levels" at d/c.  Pt stated MD Dr Gil made her aware her blood levels were lower but was ok to go home.  h/h from yesterday 8.0/25.3; POC update given to dr Milad martinez who did not request repeat labs ; per DAGOBERTO LENO no lab work needed at this time  1015 am Blood patch performed Dr Stinson w/ assistance of ANAHI Tesfaye CRNA

## 2024-11-07 NOTE — PROGRESS NOTE ADULT - SUBJECTIVE AND OBJECTIVE BOX
Patient is s/p epidural blood patch. HA greatly improved since procedure. C/o back soreness, reassured. Sat upright and walked around room, and she is feeling better. Will be discharged home, given return instructions and she expressed understanding.

## 2024-11-13 LAB — SURGICAL PATHOLOGY STUDY: SIGNIFICANT CHANGE UP

## 2024-12-17 ENCOUNTER — APPOINTMENT (OUTPATIENT)
Dept: OBGYN | Facility: CLINIC | Age: 29
End: 2024-12-17
Payer: COMMERCIAL

## 2024-12-17 PROCEDURE — 0503F POSTPARTUM CARE VISIT: CPT
